# Patient Record
Sex: FEMALE | Race: WHITE
[De-identification: names, ages, dates, MRNs, and addresses within clinical notes are randomized per-mention and may not be internally consistent; named-entity substitution may affect disease eponyms.]

---

## 2018-10-29 ENCOUNTER — HOSPITAL ENCOUNTER (INPATIENT)
Dept: HOSPITAL 96 - M.ERS | Age: 83
LOS: 2 days | Discharge: HOME HEALTH | DRG: 64 | End: 2018-10-31
Attending: INTERNAL MEDICINE | Admitting: INTERNAL MEDICINE
Payer: COMMERCIAL

## 2018-10-29 VITALS — DIASTOLIC BLOOD PRESSURE: 83 MMHG | SYSTOLIC BLOOD PRESSURE: 134 MMHG

## 2018-10-29 VITALS — HEIGHT: 62.99 IN | WEIGHT: 91 LBS | BODY MASS INDEX: 16.12 KG/M2

## 2018-10-29 VITALS — SYSTOLIC BLOOD PRESSURE: 138 MMHG | DIASTOLIC BLOOD PRESSURE: 56 MMHG

## 2018-10-29 VITALS — DIASTOLIC BLOOD PRESSURE: 109 MMHG | SYSTOLIC BLOOD PRESSURE: 156 MMHG

## 2018-10-29 DIAGNOSIS — Q06.4: ICD-10-CM

## 2018-10-29 DIAGNOSIS — I63.89: Primary | ICD-10-CM

## 2018-10-29 DIAGNOSIS — F17.210: ICD-10-CM

## 2018-10-29 DIAGNOSIS — E78.00: ICD-10-CM

## 2018-10-29 DIAGNOSIS — G81.94: ICD-10-CM

## 2018-10-29 DIAGNOSIS — G93.41: ICD-10-CM

## 2018-10-29 DIAGNOSIS — N39.0: ICD-10-CM

## 2018-10-29 DIAGNOSIS — Z79.899: ICD-10-CM

## 2018-10-29 LAB
ABSOLUTE BASOPHILS: 0.1 THOU/UL (ref 0–0.2)
ABSOLUTE EOSINOPHILS: 0.7 THOU/UL (ref 0–0.7)
ABSOLUTE MONOCYTES: 1 THOU/UL (ref 0–1.2)
ALBUMIN SERPL-MCNC: 3.3 G/DL (ref 3.4–5)
ALP SERPL-CCNC: 61 U/L (ref 46–116)
ALT SERPL-CCNC: 9 U/L (ref 30–65)
ANION GAP SERPL CALC-SCNC: 10 MMOL/L (ref 7–16)
AST SERPL-CCNC: 13 U/L (ref 15–37)
BASOPHILS NFR BLD AUTO: 1.3 %
BILIRUB SERPL-MCNC: 0.3 MG/DL
BUN SERPL-MCNC: 20 MG/DL (ref 7–18)
CALCIUM SERPL-MCNC: 9.3 MG/DL (ref 8.5–10.1)
CHLORIDE SERPL-SCNC: 101 MMOL/L (ref 98–107)
CO2 SERPL-SCNC: 25 MMOL/L (ref 21–32)
CREAT SERPL-MCNC: 1.1 MG/DL (ref 0.6–1.3)
EOSINOPHIL NFR BLD: 8.2 %
GLUCOSE SERPL-MCNC: 102 MG/DL (ref 70–99)
GRANULOCYTES NFR BLD MANUAL: 52.7 %
HCT VFR BLD CALC: 36.5 % (ref 37–47)
HGB BLD-MCNC: 12.2 GM/DL (ref 12–15)
LYMPHOCYTES # BLD: 2.3 THOU/UL (ref 0.8–5.3)
LYMPHOCYTES NFR BLD AUTO: 26.4 %
MCH RBC QN AUTO: 31.8 PG (ref 26–34)
MCHC RBC AUTO-ENTMCNC: 33.3 G/DL (ref 28–37)
MCV RBC: 95.5 FL (ref 80–100)
MONOCYTES NFR BLD: 11.4 %
MPV: 7.9 FL. (ref 7.2–11.1)
NEUTROPHILS # BLD: 4.7 THOU/UL (ref 1.6–8.1)
NT-PRO BRAIN NAT PEPTIDE: 207 PG/ML (ref ?–300)
NUCLEATED RBCS: 0 /100WBC
PLATELET COUNT*: 367 THOU/UL (ref 150–400)
POTASSIUM SERPL-SCNC: 4.1 MMOL/L (ref 3.5–5.1)
PROT SERPL-MCNC: 7.4 G/DL (ref 6.4–8.2)
RBC # BLD AUTO: 3.83 MIL/UL (ref 4.2–5)
RDW-CV: 14.7 % (ref 10.5–14.5)
SODIUM SERPL-SCNC: 136 MMOL/L (ref 136–145)
TROPONIN-I LEVEL: <0.06 NG/ML (ref ?–0.06)
WBC # BLD AUTO: 8.8 THOU/UL (ref 4–11)

## 2018-10-29 NOTE — NUR
PT ARRIVED TO ROOM 205. GOWNED AND MONITOR ON. PT ORIENTED TO ROOM. FAMILY AT
BS. BED ALARM ON. NO SWALLOWING DIFFICULTY. PT GIVEN DINNER

## 2018-10-30 VITALS — SYSTOLIC BLOOD PRESSURE: 117 MMHG | DIASTOLIC BLOOD PRESSURE: 48 MMHG

## 2018-10-30 VITALS — SYSTOLIC BLOOD PRESSURE: 169 MMHG | DIASTOLIC BLOOD PRESSURE: 69 MMHG

## 2018-10-30 VITALS — DIASTOLIC BLOOD PRESSURE: 59 MMHG | SYSTOLIC BLOOD PRESSURE: 119 MMHG

## 2018-10-30 VITALS — DIASTOLIC BLOOD PRESSURE: 51 MMHG | SYSTOLIC BLOOD PRESSURE: 141 MMHG

## 2018-10-30 VITALS — DIASTOLIC BLOOD PRESSURE: 62 MMHG | SYSTOLIC BLOOD PRESSURE: 142 MMHG

## 2018-10-30 VITALS — SYSTOLIC BLOOD PRESSURE: 131 MMHG | DIASTOLIC BLOOD PRESSURE: 63 MMHG

## 2018-10-30 LAB
BACTERIA-REFLEX: (no result) /HPF
BILIRUB UR-MCNC: NEGATIVE MG/DL
CHOLEST SERPL-MCNC: 131 MG/DL (ref ?–200)
COLOR UR: YELLOW
HDLC SERPL-MCNC: 58 MG/DL (ref 40–?)
KETONES UR STRIP-MCNC: NEGATIVE MG/DL
LDLC SERPL-MCNC: 64 MG/DL (ref ?–100)
MUCUS: (no result) STRN/LPF
PROT UR QL STRIP: (no result)
RBC # UR STRIP: (no result) /UL
RBC #/AREA URNS HPF: (no result) /HPF (ref 0–2)
SP GR UR STRIP: 1.02 (ref 1–1.03)
SQUAMOUS: (no result) /LPF (ref 0–3)
TC:HDL: 2.3 RATIO
TRIGL SERPL-MCNC: 46 MG/DL (ref ?–150)
URINE CLARITY: CLEAR
URINE GLUCOSE-RANDOM: NEGATIVE
URINE LEUKOCYTES-REFLEX: (no result)
URINE NITRITE-REFLEX: POSITIVE
URINE WBC-REFLEX: (no result) /HPF (ref 0–5)
UROBILINOGEN UR STRIP-ACNC: 1 E.U./DL (ref 0.2–1)
VLDLC SERPL CALC-MCNC: 9 MG/DL (ref ?–40)

## 2018-10-30 NOTE — EKG
[place] [address]
Phone:  [phone]                     ELECTROCARDIOGRAM REPORT      
_______________________________________________________________________________
 
Name:       JONATHAN SINELIER EASTON                Room:           [address]    ADM [place]  
M.R.#:  [id number]      Account #:      [id number]  
Admission:  10/29/18     Attend Phys:    Maria Esther Rdz MD 
Discharge:               Date of Birth:  [birth year]  
         Report #: [id number]
    [id number]
_______________________________________________________________________________
THIS REPORT FOR:  //name//                      
 
                         [facility] ED
                                       
Test Date:    2018-10-29               Test Time:    16:06:46
Pat Name:     LEON SIN            Department:   
Patient ID:   [id number]            Room:         [facility]
Gender:       F                        Technician:   IZABELLA BALDERRAMA
:          1931               Requested By: Jesus Garcia
Order Number: [id number] MD:   Nixon Mcdermott
                                 Measurements
Intervals                              Axis          
Rate:         82                       P:            45
MI:           151                      QRS:          -44
QRSD:         84                       T:            23
QT:           380                                    
QTc:          444                                    
                           Interpretive Statements
Sinus rhythm
Possible left atrial enlargement
Left axis deviation
Borderline low voltage, extremity leads
No previous ECG available for comparison
 
Electronically Signed On 10- 18:12:20 CDT by Nixon Mcdermott
[email or web address]
 
 
 
 
 
 
 
 
 
 
 
 
 
 
 
 
 
  <ELECTRONICALLY SIGNED>
                                           By: Nixon Mcdermott MD, [facility]   
  10/30/18     1812
D: 10/29/18 1606   _____________________________________
T: 10/29/18 1606   Nixon Mcdermott MD, [facility]     /EPI

## 2018-10-30 NOTE — 2DMMODE
[place] [address]
Phone:  [phone] 2 D/M-MODE ECHOCARDIOGRAM     
_______________________________________________________________________________
 
Name:         LEON SIN               Room:          [address] [place] 
[facility]#:    [id number]     Account #:     [id number]  
Admission:    10/29/18    Attend Phys:   Maria Esther Rdz, 
Discharge:                Date of Birth: [birth year]  
Date of Service: 10/30/18 1603  Report #:      [id number]
        [id number]
_______________________________________________________________________________
THIS REPORT FOR:  //name//                      
 
 
--------------- APPROVED REPORT --------------
 
 
Study performed:  10/30/2018 10:27:38
 
EXAM: Comprehensive 2D, Doppler, and color-flow 
Echocardiogram 
Patient Location: In-Patient   
Room #:  [facility]     Status:  routine
 
      BSA:         1.35
HR: 71 bpm BP:          131/63 mmHg 
Rhythm: NSR     
 
Other Information 
Study Quality: Good
 
Indications
CVA/TIA 
 
Echo Enhancing Agent
Indication: Rule out Shunt
Agent(s) / Amount(s) Used: Agitated Saline 10 cc
 
2D Dimensions
IVSd:  10.10 (7-11mm) LVOT Diam:  20.08 (18-24mm) 
LVDd:  40.13 mm  
PWd:  9.26 (7-11mm) Ascending Ao:  28.10 (22-36mm)
LVDs:  20.33 (25-40mm) 
Aortic Root:  28.28 mm 
 
Volumes
Left Atrial Volume (Systole) 
    LA ESV Index:  29.50 mL/m2
 
Aortic Valve
AoV Peak Vincenzo.:  1.14 m/s 
AO Peak Gr.:  5.22 mmHg  LVOT Max PG:  3.06 mmHg
AO Mean Gr.:  2.98 mmHg  LVOT Mean P.45 mmHg
    LVOT Max V:  0.88 m/s
AO V2 VTI:  22.23 cm  LVOT Mean V:  0.55 m/s
BRE (VTI):  2.49 cm2  LVOT V1 VTI:  17.44 cm
 
 
 
[place] [address]
Phone:  [phone]                     2 D/M-MODE ECHOCARDIOGRAM     
_______________________________________________________________________________
 
Name:         LEON SIN               Room:          [address] [place] 
.R.#:    [id number]     Account #:     [id number]  
Admission:    10/29/18    Attend Phys:   Maria Esther Rdz, 
Discharge:                Date of Birth: [birth year]  
Date of Service: 10/30/18 1603  Report #:      [id number]
        [id number]
_______________________________________________________________________________
Mitral Valve
    E/A Ratio:  0.78
    MV Decel. Time:  171.97 ms
MV E Max Vincenzo.:  0.91 m/s 
MV PHT:  49.87 ms  
MVA (PHT):  4.41 cm2  
 
TDI
E/Lateral E':  11.38 E/Medial E':  13.00
   Medial E' Vincenzo.:  0.07 m/s
   Lateral E' Vincenzo.:  0.08 m/s
 
Pulmonary Valve
PV Peak Vincenzo.:  0.83 m/s PV Peak Gr.:  2.74 mmHg
 
Tricuspid Valve
    RAP Estimate:  5.00 mmHg
TR Peak Gr.:  18.93 mmHg RVSP:  24.00 mmHg
    PA Pressure:  24.00 mmHg
 
Left Ventricle
The left ventricle is normal size. There is normal LV segmental wall 
motion. There is normal left ventricular wall thickness. Left 
ventricular systolic function is normal. LVEF is 60-65%. Grade I - 
abnormal relaxation pattern.
 
Right Ventricle
The right ventricle is normal size. The right ventricular systolic 
function is normal.
 
Atria
The left atrium size is normal. Interatrial septum is intact without 
evidence of ASD or PFO. The right atrium size is normal.
 
Aortic Valve
Mild aortic valve sclerosis. No aortic regurgitation is present. 
There is no aortic valvular stenosis.
 
Mitral Valve
Moderate mitral annular calcification. Mild mitral regurgitation. No 
evidence of mitral valve stenosis.
 
Tricuspid Valve
The tricuspid valve is normal in structure. Mild tricuspid 
regurgitation. No pulmonary hypertension.
 
 
 
[place] [address]
Phone:  [phone]                     2 D/M-MODE ECHOCARDIOGRAM     
_______________________________________________________________________________
 
Name:         LEON SIN RUSTAM               Room:          [address] [place] 
[facility]#:    [id number]     Account #:     [id number]  
Admission:    10/29/18    Attend Phys:   Maria Esther Rdz, 
Discharge:                Date of Birth: [birth year]  
Date of Service: 10/30/18 1603  Report #:      [id number]
        [id number]
_______________________________________________________________________________
Pulmonic Valve
The pulmonary valve is normal in structure. There is no pulmonic 
valvular regurgitation.
 
Great Vessels
The aortic root is normal in size. IVC is normal in size and 
collapses &gt;50% with inspiration.
 
Pericardium
There is no pericardial effusion.
 
&lt;Conclusion&gt;
The left ventricle is normal size.
There is normal left ventricular wall thickness.
Left ventricular systolic function is normal.
LVEF is 60-65%.
Grade I - abnormal relaxation pattern.
Mild mitral regurgitation.
Mild tricuspid regurgitation.
No pulmonary hypertension.
IVC is normal in size and collapses &gt;50% with 
inspiration.
Interatrial septum is intact without evidence of ASD or PFO.
Moderate mitral annular calcification.
Mild aortic valve sclerosis.
 
 
 
 
 
 
 
 
 
 
 
 
 
 
 
 
 
 
 
  <ELECTRONICALLY SIGNED>
                                           By: Nixon Mcdermott MD, FACC   
  10/30/18     1603
D: 10/30/18 1603   _____________________________________
T: 10/30/18 1603   Nixon Mcdermott MD, FACC     /INF

## 2018-10-30 NOTE — NUR
ACUTE INPATIENT REHAB UNIT CONSULT RECEIVED AND ACKNOWLEDGED BY DR. CHRISTIANSON AND
REHAB LIAISON. PATIENT HAS [facility] WHICH UNFORTUNATELY IS NOT A PROVIDER
ACCEPTED BY ACUTE REHAB AT [facility].  THANK YOU FOR THIS CONSULT.

## 2018-10-30 NOTE — NUR
PT IS ABLE TO COMMUNICATE HER NEEDS TO STAFF WITH MINOR DIFFICULTY; SHE IS [place]
AND CONFUSED AT TIMES. SHE HAS DENIED THE NEED FOR PAIN MEDICATION UP THIS
TIME. POSSIBLE ECHO LATER TODAY. PT UP WITH WALKER AND IS QUITE UNSTEADY;
USING BEDSIDE COMODE UP TO THIS TIME.

## 2018-10-30 NOTE — NUR
Pt is A&O. Resides at home with her Dtr and grandson. Pt stated "I do what I
can at home and my family does what I can't." Independent with IADLs. Pt has a
RW. No hx of HH or SNF. Pt's [facility]. Discussed rehab
consult v. SNF, Pt stated "my family helps me, I don't need that." CM left 
for Pt's dtr to discuss disposition, awaiting call back. Following.

## 2018-10-30 NOTE — NUR
ASSUMED CARE OF PT AT 0730. PT RESTING IN BED WAITING FOR BREAKFAST. PT A&0X3,
FORGETFUL AND CONFUSED AT TIMES. PT IMPULSIVE AS WELL. BED ALARM IN PLACE. NIH
COMPLETED- PT SCORING 1 DUE TO ORIENTATION. LEFT SIDED UPPER EXTREMITY
WEAKNESS HAS SUBSIDED. PT DENIES ANY NUMBNESS AND TINGLING. NEURO CONSULT IN
PLACE. PT TRACING SR ON THE CARDIAC MONITOR. ON RA SAT UPPER 90'S. DENIES ANY
PAIN OR SHORTNESS OF BREATH AT THIS TIME. PT UP WITH 1-2 ASSIST, WEAK AND
UNSTEADY AT TIMES. PT GOAL FOR TODAY IS NEURO CONSULT, REHAB CONSULT, PT, OT
EVAL AND TREAT AND OBTAIN URINALYSIS. AM ASSESSMENT AS CHARTED. MEDICATIONS
PER MAR. PT REPOSITIONS SELF WITH REMINDERS. HOURLY ROUNDING OBSERVED. BED IN
LOW POSITION. BED ALARM IN PLACE. FALL PRECAUTIONS IN PLACE. CALL LIGHT WITHIN
REACH. WILL CONTINUE PLAN OF CARE.

## 2018-10-30 NOTE — NUR
NO ACUTE CHANGES THROUGHOUT SHIFT. REFER TO CHARTING. URINALYSIS
OBTAINED-REFER TO RESULTS. PT HAD ECHO-REFER TO RESULTS. PT HAD PT, OT AND ST
EVAL AND TREAT-TOLERATED WELL. PT UP TO BSC MULTIPLE TIMES WITH 1
ASSIST-UNSTEADY AT TIMES. PT DAUGHTER AT BEDSIDE THIS AFTERNOON-UPDATED ON
CURRENT PLAN OF CARE. PT IMPULSIVE- BED ALARM IN PLACE. PT INCONT OF BOWEL AND
BLADDER. PT PROGRESSING TOWARDS GOALS. CONTINUES TO TRACE SR ON THE CARDIAC
MONITOR. ON RA SAT UPPER 90'S. DENIES ANY SHORTNESS OF BREATH OR PAIN
THROUGHOUT SHIFT. NIH CONTINUES TO BE 1. MEDICATIONS PER MAR. PT REPOSITIONS
SELF WITH REMINDERS. HOURLY ROUNDING OBSERVED. BED IN LOW POSITION. BED ALARM
IN PLACE. FALL PRECAUTIONS IN PLACE. CALL LIGHT WITHIN REACH. WILL CONTINUE
PLAN OF CARE.

## 2018-10-31 VITALS — SYSTOLIC BLOOD PRESSURE: 106 MMHG | DIASTOLIC BLOOD PRESSURE: 56 MMHG

## 2018-10-31 VITALS — SYSTOLIC BLOOD PRESSURE: 105 MMHG | DIASTOLIC BLOOD PRESSURE: 54 MMHG

## 2018-10-31 VITALS — SYSTOLIC BLOOD PRESSURE: 123 MMHG | DIASTOLIC BLOOD PRESSURE: 55 MMHG

## 2018-10-31 VITALS — DIASTOLIC BLOOD PRESSURE: 53 MMHG | SYSTOLIC BLOOD PRESSURE: 103 MMHG

## 2018-10-31 VITALS — DIASTOLIC BLOOD PRESSURE: 55 MMHG | SYSTOLIC BLOOD PRESSURE: 123 MMHG

## 2018-10-31 NOTE — NUR
CM spoke with Pt's dtr via phone. Dtr plans for Pt to return home at [place]. Dtr
informed that Pt had just begun inhome PT through [facility], prior to her
admission and wants Pt to continue HH PT at [facility]. Following.

## 2018-10-31 NOTE — NUR
DISCHARGE PLANNER SPOKE TO THE PATIENT TO DISCUSS DISCHARGE PLANNING NEEDS AND
SKILLED AT D/C. PATIENT DECLINED STATING 'I WALKER A LOT FARTHER TODAY, AND
I'M GOING HOME'. D/C PLANNER INFORMED THE CM OF THIS INFO. CM WILL REMAIN
AVAILABLE TO ASSIST AND FOLLOW AS NEEDED.

## 2018-10-31 NOTE — NUR
ASSUMED CARE OF PT AFTER REPORT AT 1930. PT A&OX4, FORGETFUL, IMPULSIVE &
HARD OF HEARING.  VSS. PHYSICAL ASSESSMENT COMPLETED AND CHARTED. PT ON RA
WITH 96% O2 SAT. PT TRACING SR/SB ON TELE . PT UP WITH 1 ASSIST TO BEDSIDE
COMMODE. NIH AS CHARTED. PT REQUESTED FOR SLEEPING PILL- DR KELLY INFORMED
WITH NEW ORDER. DENIES ANY PAIN OR SOA. HS REST & SAFETY GOALS ACHIEVED. CALL
LIGHT WITHIN REACH. BED IN LOW POSITION. BED ALARM ON.

## 2018-11-03 NOTE — EEG
[address]  [address]                    EEG STUDY REPORT              
_______________________________________________________________________________
 
Name:       LEON SIN RUSTAM                Room:           [address] [place]  
M.R.#:  [id number]      Account #:      [id number]  
Admission:  10/29/18     Attend Phys:    Maria Esther Rdz MD 
Discharge:  10/31/18     Date of Birth:  [birth year]  
         Report #: [id number]
                                                                     [id number]  
_______________________________________________________________________________
THIS REPORT FOR:  //name//                      
 
CC: Maria Esther Zabala
 
DATE OF SERVICE:  10/30/2018
 
 
This patient is being evaluated for altered mental status and weakness on the
left side.  She had a stroke.  EEG was done to look for any seizure activity
arising from there.  Background activity in this patient's EEG is about 9-10 Hz
and 40 microvolt.  The patient went to sleep and that was associated with
bilaterally symmetrical sleep spindle and vertex sharp waves.  Throughout the
record, no active epileptiform activity was noticed.  Photic stimulation is
unremarkable.
 
IMPRESSION:  This patient's EEG does not show any definite epileptiform
activity.  It is intermixed with a lot of theta range slowing on both sides and
lot of artifact.
 
 
 
 
 
 
 
 
 
 
 
 
 
 
 
 
 
 
 
 
 
 
 
 
 
<ELECTRONICALLY SIGNED>
                                        By:  Per Dunlap MD             
11/03/18     1715
D: 10/31/18 0828_______________________________________
T: 10/31/18 0906Per Dunlap MD                /nt

## 2018-11-03 NOTE — CON
[address]  [address]                    CONSULTATION                  
_______________________________________________________________________________
 
Name:       LEON SIN                Room:           [address] [place]  
M.R.#:  [id number]      Account #:      [id number]  
Admission:  10/29/18     Attend Phys:    Maria Esther Rdz MD 
Discharge:  10/31/18     Date of Birth:  [birth year]  
         Report #: [id number]
                                                                     [id number]  
_______________________________________________________________________________
THIS REPORT FOR:  //name//                      
 
CC: Maria Esther Zabala
 
DATE OF SERVICE:  10/30/2018
 
 
HISTORY OF PRESENT ILLNESS:  This is an 86-year-old female patient who was
evaluated by me for left arm weakness.  History was not very clear yesterday
when I talked to the Emergency Room.  The patient is a poor historian, but it
does look like that her weakness started suddenly, it looks like she woke up
with it.  She also has some weakness in the lower extremities, but I believe
that is her baseline.  She denies any neck pain.  She thinks there is some
tingling in the left upper extremity.  She has a prior history of hip
replacement and that may be part of the reason she has ambulation difficulty. 
The duration of this is really unclear.
 
REVIEW OF SYSTEMS:  Indicate that she has history of UTI.  She denies any prior
history of stroke.  It is difficult to get history from her.  I carried out the
14-point review of systems, but she denies any new eye, ENT, cardiac,
respiratory, GI, , musculoskeletal, constitutional, dermatological,
hematological, psychiatric, throat, allergic symptom associated with present
symptomatology which has not been described above.
 
PAST MEDICAL HISTORY:  Noncontributory and is negative for any stroke.
 
FAMILY HISTORY:  Negative for early age stroke.
 
SOCIAL HISTORY:  She indicates she lives with the family and she is able to do
most of the things by herself.  She does have a history of smoking.
 
PHYSICAL EXAMINATION:  Indicate she is alert.  She is responsive.  She can
follow simple commands.  Her memory, fund of knowledge is poor.  Her speech is
intact.  Cranial nerve examination 2-12 looks mostly unremarkable.  She does
have some weakness in the left upper extremity.  Weakness in the lower
extremities is in generalized fashion.  She indicates her position sense is
intact.  Reflexes are symmetrical.  Tone is unremarkable.  There is no
cerebellar sign in this patient.  I could not look at the patient's fundus. 
There is no carotid bruit.  There is no thyroid mass.  She is a thinly-built
individual who does not have any dysmorphic features of eyes, ears and face. 
Cardiac examination is unremarkable.  No marked respiratory difficulty was
noticed, although she has some scattered rhonchi on both sides.  Pulses are
difficult to feel.  She has no edema, cyanosis or jaundice.  Blood pressure is
117/48, respirations 18, pulse is 76, temperature is 98.2.
 
 
 
 
[place] [id number]                    CONSULTATION                  
_______________________________________________________________________________
 
Name:       MERRICKLEON EASTON                Room:           [address] [place]  
M.R.#:  [id number]      Account #:      [id number]  
Admission:  10/29/18     Attend Phys:    Maria Esther Rdz MD 
Discharge:  10/31/18     Date of Birth:  [birth year]  
         Report #: [id number]
                                                                     [id number]  
_______________________________________________________________________________
LABORATORY DATA:  Indicate white count of 8.8.  Sodium is normal.
 
Imaging studies were reviewed and that does show findings consistent with
right-sided stroke.
 
IMPRESSION:
1.  Right-sided stroke.
2.  Left-sided weakness secondary right-sided stroke.
3.  Ambulation difficulty, which is old.
 
RECOMMENDATIONS:
1.  Continue aspirin.
2.  Statin.
3.  DVT prophylaxis.
4.  PT, OT.
5.  Rehabilitation consult.
6.  She will need more workup as an outpatient, especially because of ambulation
difficulty.  May be starting with EMG.
 
All of it was discussed with the patient in great detail and she wants to follow
this plan and we will do that.  We will see if she qualifies for the
rehabilitation.
 
 
 
 
 
 
 
 
 
 
 
 
 
 
 
 
 
 
 
 
 
 
<ELECTRONICALLY SIGNED>
                                        By:  Per Dunlap MD             
11/03/18     1715
D: 10/30/18 1339_______________________________________
T: 10/30/18 2204Pjusten Dunlap MD                /nt

## 2020-04-07 ENCOUNTER — HOSPITAL ENCOUNTER (EMERGENCY)
Dept: HOSPITAL 96 - M.ERS | Age: 85
Discharge: HOME | End: 2020-04-07
Payer: COMMERCIAL

## 2020-04-07 VITALS — DIASTOLIC BLOOD PRESSURE: 82 MMHG | SYSTOLIC BLOOD PRESSURE: 122 MMHG

## 2020-04-07 VITALS — HEIGHT: 62 IN | BODY MASS INDEX: 16.38 KG/M2 | WEIGHT: 89 LBS
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
## 2020-04-07 DIAGNOSIS — E78.5: ICD-10-CM
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
## 2020-04-07 DIAGNOSIS — N20.0: ICD-10-CM

## 2020-04-07 DIAGNOSIS — S32.030A: Primary | ICD-10-CM
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
## 2020-04-07 DIAGNOSIS — M25.552: ICD-10-CM

## 2020-04-07 DIAGNOSIS — N39.0: ICD-10-CM

## 2020-04-07 LAB
ABSOLUTE BASOPHILS: 0.1 THOU/UL (ref 0–0.2)
ABSOLUTE EOSINOPHILS: 0.2 THOU/UL (ref 0–0.7)
ABSOLUTE MONOCYTES: 1 THOU/UL (ref 0–1.2)
ALBUMIN SERPL-MCNC: 3.3 G/DL (ref 3.4–5)
ALP SERPL-CCNC: 73 U/L (ref 46–116)
ALT SERPL-CCNC: 13 U/L (ref 30–65)
ANION GAP SERPL CALC-SCNC: 7 MMOL/L (ref 7–16)
AST SERPL-CCNC: 18 U/L (ref 15–37)
BACTERIA-REFLEX: (no result) /HPF
BASOPHILS NFR BLD AUTO: 1 %
BILIRUB SERPL-MCNC: 0.7 MG/DL
BILIRUB UR-MCNC: (no result) MG/DL
BUN SERPL-MCNC: 24 MG/DL (ref 7–18)
CALCIUM SERPL-MCNC: 7.9 MG/DL (ref 8.5–10.1)
CHLORIDE SERPL-SCNC: 97 MMOL/L (ref 98–107)
CO2 SERPL-SCNC: 28 MMOL/L (ref 21–32)
COLOR UR: (no result)
CREAT SERPL-MCNC: 1.2 MG/DL (ref 0.6–1.3)
EOSINOPHIL NFR BLD: 1.3 %
GLUCOSE SERPL-MCNC: 86 MG/DL (ref 70–99)
GRANULOCYTES NFR BLD MANUAL: 82.2 %
HCT VFR BLD CALC: 35.7 % (ref 37–47)
HGB BLD-MCNC: 12 GM/DL (ref 12–15)
KETONES UR STRIP-MCNC: NEGATIVE MG/DL
LYMPHOCYTES # BLD: 1 THOU/UL (ref 0.8–5.3)
LYMPHOCYTES NFR BLD AUTO: 7.8 %
MCH RBC QN AUTO: 31.4 PG (ref 26–34)
MCHC RBC AUTO-ENTMCNC: 33.7 G/DL (ref 28–37)
MCV RBC: 93.4 FL (ref 80–100)
MONOCYTES NFR BLD: 7.7 %
MPV: 8.3 FL. (ref 7.2–11.1)
NEUTROPHILS # BLD: 10.5 THOU/UL (ref 1.6–8.1)
NT-PRO BRAIN NAT PEPTIDE: 658 PG/ML (ref ?–300)
NUCLEATED RBCS: 0 /100WBC
PLATELET COUNT*: 261 THOU/UL (ref 150–400)
POTASSIUM SERPL-SCNC: 4.3 MMOL/L (ref 3.5–5.1)
PROT SERPL-MCNC: 6.8 G/DL (ref 6.4–8.2)
PROT UR QL STRIP: (no result)
RBC # BLD AUTO: 3.82 MIL/UL (ref 4.2–5)
RBC # UR STRIP: (no result) /UL
RDW-CV: 15.7 % (ref 10.5–14.5)
SODIUM SERPL-SCNC: 132 MMOL/L (ref 136–145)
SP GR UR STRIP: 1.02 (ref 1–1.03)
SQUAMOUS: (no result) /LPF (ref 0–3)
URINE CLARITY: (no result)
URINE GLUCOSE-RANDOM: NEGATIVE
URINE LEUKOCYTES-REFLEX: (no result)
URINE NITRITE-REFLEX: NEGATIVE
UROBILINOGEN UR STRIP-ACNC: 1 E.U./DL (ref 0.2–1)
WBC # BLD AUTO: 12.8 THOU/UL (ref 4–11)

## 2020-04-07 NOTE — EKG
[place] [address]
Phone:  [phone]                     ELECTROCARDIOGRAM REPORT      
_______________________________________________________________________________
 
Name:         JONATHAN SINELIER EASTON               Room:                     [facility]#:    [id number]     Account #:     [id number]  
Admission:    20    Attend Phys:                     
Discharge:    20    Date of Birth: [birth year]  
Date of Service: 20 1225  Report #:      [id number]
        [id number]
_______________________________________________________________________________
THIS REPORT FOR:  //name//                      
 
                         [facility] ED
                                       
Test Date:    2020               Test Time:    12:25:29
Pat Name:     LEON SIN            Department:   
Patient ID:   [id number]            Room:          
Gender:                               Technician:   DIXON
:          1931               Requested By: Porsche Mantilla
Order Number: [id number] MD:   Nixon Mcdermott
                                 Measurements
Intervals                              Axis          
Rate:         81                       P:            58
OH:           152                      QRS:          -44
QRSD:         81                       T:            49
QT:           373                                    
QTc:          433                                    
                           Interpretive Statements
Sinus rhythm
Probable left atrial enlargement
Left axis deviation
Borderline low voltage, extremity leads
Baseline wander in lead(s) V6
Compared to ECG 10/29/2018 16:06:46
No significant changes
 
Electronically Signed On 2020 16:15:11 CDT by Nixon Mcdermott
[email or web address]
 
 
 
 
 
 
 
 
 
 
 
 
 
 
 
 
  <ELECTRONICALLY SIGNED>
                                           By: Nixon Mcdermott MD, FAC   
  20     1615
D: 20 1225   _____________________________________
T: 20 1225   Nixon Mcdermott MD, [facility]     /EPI

## 2020-04-15 ENCOUNTER — HOSPITAL ENCOUNTER (INPATIENT)
Dept: HOSPITAL 96 - M.ERS | Age: 85
LOS: 2 days | Discharge: HOME | DRG: 871 | End: 2020-04-17
Attending: INTERNAL MEDICINE | Admitting: INTERNAL MEDICINE
Payer: COMMERCIAL

## 2020-04-15 VITALS
BODY MASS INDEX: 15.77 KG/M2 | HEIGHT: 62.99 IN | WEIGHT: 89 LBS | DIASTOLIC BLOOD PRESSURE: 59 MMHG | SYSTOLIC BLOOD PRESSURE: 133 MMHG

## 2020-04-15 VITALS — SYSTOLIC BLOOD PRESSURE: 138 MMHG | DIASTOLIC BLOOD PRESSURE: 90 MMHG

## 2020-04-15 VITALS — SYSTOLIC BLOOD PRESSURE: 115 MMHG | DIASTOLIC BLOOD PRESSURE: 65 MMHG

## 2020-04-15 VITALS — SYSTOLIC BLOOD PRESSURE: 143 MMHG | DIASTOLIC BLOOD PRESSURE: 66 MMHG

## 2020-04-15 DIAGNOSIS — J40: ICD-10-CM

## 2020-04-15 DIAGNOSIS — F17.210: ICD-10-CM

## 2020-04-15 DIAGNOSIS — J69.0: ICD-10-CM

## 2020-04-15 DIAGNOSIS — J43.9: ICD-10-CM

## 2020-04-15 DIAGNOSIS — A41.9: Primary | ICD-10-CM

## 2020-04-15 DIAGNOSIS — N39.0: ICD-10-CM

## 2020-04-15 DIAGNOSIS — F03.90: ICD-10-CM

## 2020-04-15 DIAGNOSIS — M48.56XA: ICD-10-CM

## 2020-04-15 DIAGNOSIS — E78.5: ICD-10-CM

## 2020-04-15 DIAGNOSIS — G93.41: ICD-10-CM

## 2020-04-15 DIAGNOSIS — Z87.442: ICD-10-CM

## 2020-04-15 DIAGNOSIS — Z79.82: ICD-10-CM

## 2020-04-15 DIAGNOSIS — Z20.828: ICD-10-CM

## 2020-04-15 DIAGNOSIS — Z79.899: ICD-10-CM

## 2020-04-15 DIAGNOSIS — J96.01: ICD-10-CM

## 2020-04-15 DIAGNOSIS — I69.354: ICD-10-CM

## 2020-04-15 LAB
ABSOLUTE EOSINOPHILS: 1.7 THOU/UL (ref 0–0.7)
ABSOLUTE MONOCYTES: 1.1 THOU/UL (ref 0–1.2)
ALBUMIN SERPL-MCNC: 3.2 G/DL (ref 3.4–5)
ALP SERPL-CCNC: 101 U/L (ref 46–116)
ALT SERPL-CCNC: 15 U/L (ref 30–65)
ANION GAP SERPL CALC-SCNC: 9 MMOL/L (ref 7–16)
ANISOCYTOSIS BLD QL SMEAR: (no result)
APTT BLD: 30.3 SECONDS (ref 25–31.3)
AST SERPL-CCNC: 16 U/L (ref 15–37)
BACTERIA-REFLEX: (no result) /HPF
BILIRUB SERPL-MCNC: 0.4 MG/DL
BILIRUB UR-MCNC: NEGATIVE MG/DL
BUN SERPL-MCNC: 29 MG/DL (ref 7–18)
CALCIUM SERPL-MCNC: 8.7 MG/DL (ref 8.5–10.1)
CHLORIDE SERPL-SCNC: 100 MMOL/L (ref 98–107)
CO2 SERPL-SCNC: 28 MMOL/L (ref 21–32)
COLOR UR: YELLOW
CREAT SERPL-MCNC: 1.2 MG/DL (ref 0.6–1.3)
EOSINOPHIL NFR BLD: 11 %
GLUCOSE SERPL-MCNC: 96 MG/DL (ref 70–99)
GRANULOCYTES NFR BLD MANUAL: 79 %
HCT VFR BLD CALC: 39.9 % (ref 37–47)
HGB BLD-MCNC: 13.2 GM/DL (ref 12–15)
INR PPP: 1.2
KETONES UR STRIP-MCNC: NEGATIVE MG/DL
LYMPHOCYTES # BLD: 0.5 THOU/UL (ref 0.8–5.3)
LYMPHOCYTES NFR BLD AUTO: 3 %
MCH RBC QN AUTO: 31 PG (ref 26–34)
MCHC RBC AUTO-ENTMCNC: 33.2 G/DL (ref 28–37)
MCV RBC: 93.4 FL (ref 80–100)
MONOCYTES NFR BLD: 7 %
MPV: 8.3 FL. (ref 7.2–11.1)
MUCUS: (no result) STRN/LPF
NEUTROPHILS # BLD: 12.6 THOU/UL (ref 1.6–8.1)
NT-PRO BRAIN NAT PEPTIDE: 747 PG/ML (ref ?–300)
NUCLEATED RBCS: 0 /100WBC
PLATELET # BLD EST: ADEQUATE 10*3/UL
PLATELET COUNT*: 421 THOU/UL (ref 150–400)
POTASSIUM SERPL-SCNC: 3.6 MMOL/L (ref 3.5–5.1)
PROT SERPL-MCNC: 7.6 G/DL (ref 6.4–8.2)
PROT UR QL STRIP: (no result)
PROTHROMBIN TIME: 12.1 SECONDS (ref 9.2–11.5)
RBC # BLD AUTO: 4.28 MIL/UL (ref 4.2–5)
RBC # UR STRIP: (no result) /UL
RBC #/AREA URNS HPF: (no result) /HPF (ref 0–2)
RDW-CV: 15.6 % (ref 10.5–14.5)
SODIUM SERPL-SCNC: 137 MMOL/L (ref 136–145)
SP GR UR STRIP: 1.02 (ref 1–1.03)
SQUAMOUS: (no result) /LPF (ref 0–3)
TOXIC GRANULES BLD QL SMEAR: (no result)
URINE CLARITY: (no result)
URINE GLUCOSE-RANDOM: NEGATIVE
URINE LEUKOCYTES-REFLEX: (no result)
URINE NITRITE-REFLEX: NEGATIVE
URINE WBC-REFLEX: (no result) /HPF (ref 0–5)
UROBILINOGEN UR STRIP-ACNC: 0.2 E.U./DL (ref 0.2–1)
WBC # BLD AUTO: 15.9 THOU/UL (ref 4–11)

## 2020-04-15 NOTE — EKG
[place] [address]
Phone:  [phone]                     ELECTROCARDIOGRAM REPORT      
_______________________________________________________________________________
 
Name:         JONATHAN SINELIER EASTON               Room:          [address]    ADM IN 
M.R.#:    [id number]     Account #:     [id number]  
Admission:    04/15/20    Attend Phys:   Riky Flores, 
Discharge:                Date of Birth: [birth year]  
Date of Service: 04/15/20 0926  Report #:      [id number]
        [id number]
_______________________________________________________________________________
THIS REPORT FOR:  //name//                      
 
                         [facility] ED
                                       
Test Date:    2020-04-15               Test Time:    09:26:31
Pat Name:     LEON SIN            Department:   
Patient ID:   [id number]            Room:         [facility]
Gender:       F                        Technician:   
:          1931               Requested By: Raffy Robles
Order Number: [id number] MD:   Kristopher Carmen
                                 Measurements
Intervals                              Axis          
Rate:         89                       P:            60
KS:           140                      QRS:          -72
QRSD:         87                       T:            52
QT:           382                                    
QTc:          465                                    
                           Interpretive Statements
Sinus rhythm
LAD, consider left anterior fascicular block
Low voltage, extremity leads
 
Electronically Signed On 4- 15:43:24 CDT by Kristopher Carmen
[email or web address]
 
 
 
 
 
 
 
 
 
 
 
 
 
 
 
 
 
 
 
 
  <ELECTRONICALLY SIGNED>
                                           By: Kristopher Carmen MD, [facility]     
  04/15/20     1543
D: 04/15/20 0926   _____________________________________
T: 04/15/20 0926   Kristopher Carmne MD, [facility]       /EPI

## 2020-04-16 VITALS — SYSTOLIC BLOOD PRESSURE: 153 MMHG | DIASTOLIC BLOOD PRESSURE: 62 MMHG

## 2020-04-16 VITALS — SYSTOLIC BLOOD PRESSURE: 146 MMHG | DIASTOLIC BLOOD PRESSURE: 72 MMHG

## 2020-04-16 VITALS — SYSTOLIC BLOOD PRESSURE: 138 MMHG | DIASTOLIC BLOOD PRESSURE: 92 MMHG

## 2020-04-16 VITALS — DIASTOLIC BLOOD PRESSURE: 68 MMHG | SYSTOLIC BLOOD PRESSURE: 131 MMHG

## 2020-04-16 LAB
ABSOLUTE BASOPHILS: 0.1 THOU/UL (ref 0–0.2)
ABSOLUTE EOSINOPHILS: 0.1 THOU/UL (ref 0–0.7)
ABSOLUTE MONOCYTES: 0.4 THOU/UL (ref 0–1.2)
ANION GAP SERPL CALC-SCNC: 10 MMOL/L (ref 7–16)
BASOPHILS NFR BLD AUTO: 1.2 %
BUN SERPL-MCNC: 30 MG/DL (ref 7–18)
CALCIUM SERPL-MCNC: 8.1 MG/DL (ref 8.5–10.1)
CHLORIDE SERPL-SCNC: 101 MMOL/L (ref 98–107)
CO2 SERPL-SCNC: 25 MMOL/L (ref 21–32)
CREAT SERPL-MCNC: 1 MG/DL (ref 0.6–1.3)
EOSINOPHIL NFR BLD: 0.8 %
GLUCOSE SERPL-MCNC: 126 MG/DL (ref 70–99)
GRANULOCYTES NFR BLD MANUAL: 81.4 %
HCT VFR BLD CALC: 36.1 % (ref 37–47)
HGB BLD-MCNC: 12 GM/DL (ref 12–15)
LYMPHOCYTES # BLD: 1.5 THOU/UL (ref 0.8–5.3)
LYMPHOCYTES NFR BLD AUTO: 12.9 %
MCH RBC QN AUTO: 30.7 PG (ref 26–34)
MCHC RBC AUTO-ENTMCNC: 33.1 G/DL (ref 28–37)
MCV RBC: 92.8 FL (ref 80–100)
MONOCYTES NFR BLD: 3.7 %
MPV: 8.2 FL. (ref 7.2–11.1)
NEUTROPHILS # BLD: 9.4 THOU/UL (ref 1.6–8.1)
NUCLEATED RBCS: 0 /100WBC
PLATELET COUNT*: 384 THOU/UL (ref 150–400)
POTASSIUM SERPL-SCNC: 3.7 MMOL/L (ref 3.5–5.1)
RBC # BLD AUTO: 3.89 MIL/UL (ref 4.2–5)
RDW-CV: 15.1 % (ref 10.5–14.5)
SODIUM SERPL-SCNC: 136 MMOL/L (ref 136–145)
WBC # BLD AUTO: 11.5 THOU/UL (ref 4–11)

## 2020-04-17 VITALS — SYSTOLIC BLOOD PRESSURE: 138 MMHG | DIASTOLIC BLOOD PRESSURE: 69 MMHG

## 2020-04-17 VITALS — DIASTOLIC BLOOD PRESSURE: 67 MMHG | SYSTOLIC BLOOD PRESSURE: 130 MMHG

## 2020-04-17 VITALS — SYSTOLIC BLOOD PRESSURE: 130 MMHG | DIASTOLIC BLOOD PRESSURE: 67 MMHG

## 2020-04-17 LAB
ABSOLUTE BASOPHILS: 0.2 THOU/UL (ref 0–0.2)
ABSOLUTE EOSINOPHILS: 0.6 THOU/UL (ref 0–0.7)
ABSOLUTE MONOCYTES: 2.6 THOU/UL (ref 0–1.2)
ANION GAP SERPL CALC-SCNC: 9 MMOL/L (ref 7–16)
BASOPHILS NFR BLD AUTO: 1 %
BUN SERPL-MCNC: 24 MG/DL (ref 7–18)
CALCIUM SERPL-MCNC: 8.1 MG/DL (ref 8.5–10.1)
CHLORIDE SERPL-SCNC: 104 MMOL/L (ref 98–107)
CO2 SERPL-SCNC: 26 MMOL/L (ref 21–32)
CREAT SERPL-MCNC: 0.8 MG/DL (ref 0.6–1.3)
EOSINOPHIL NFR BLD: 2.7 %
GLUCOSE SERPL-MCNC: 82 MG/DL (ref 70–99)
GRANULOCYTES NFR BLD MANUAL: 74.7 %
HCT VFR BLD CALC: 33.1 % (ref 37–47)
HGB BLD-MCNC: 11.2 GM/DL (ref 12–15)
LYMPHOCYTES # BLD: 2.2 THOU/UL (ref 0.8–5.3)
LYMPHOCYTES NFR BLD AUTO: 10 %
MCH RBC QN AUTO: 31.1 PG (ref 26–34)
MCHC RBC AUTO-ENTMCNC: 33.7 G/DL (ref 28–37)
MCV RBC: 92.4 FL (ref 80–100)
MONOCYTES NFR BLD: 11.6 %
MPV: 8.3 FL. (ref 7.2–11.1)
NEUTROPHILS # BLD: 16.5 THOU/UL (ref 1.6–8.1)
NUCLEATED RBCS: 0 /100WBC
PLATELET COUNT*: 406 THOU/UL (ref 150–400)
POTASSIUM SERPL-SCNC: 3.5 MMOL/L (ref 3.5–5.1)
RBC # BLD AUTO: 3.59 MIL/UL (ref 4.2–5)
RDW-CV: 15.3 % (ref 10.5–14.5)
SODIUM SERPL-SCNC: 139 MMOL/L (ref 136–145)
WBC # BLD AUTO: 22.1 THOU/UL (ref 4–11)